# Patient Record
(demographics unavailable — no encounter records)

---

## 2017-08-08 NOTE — [facility] OPERATIVE REPORT E
[facility] Operative Report



NAME: MELISSA BATISTA

                                      MRN: [id number]

                             AGE: 73Y

DATE OF SURGERY: 08/08/2017           ROOM:



PREOPERATIVE DIAGNOSIS:

Cataract, right eye.



POSTOPERATIVE DIAGNOSIS:

Cataract, right eye.



PROCEDURE PERFORMED:

Phacoemulsification with posterior chamber intraocular lens, right eye.



SURGEON:

Lorraine Pugh MD



ANESTHESIA:

Topical with MAC.



INDICATIONS FOR SURGERY:

Difficulty with night driving and glare.  Best corrected visual acuity

20/50.



PROCEDURE:

The patient was brought to the operating room and placed on the operative

table.  Following tetracaine drops, topical anesthesia was administered.  

This consisted of instrument wipe pledgets soaked in a solution of 4%

Xylocaine mixed with 0.75% Marcaine in a 1:2 ratio.  A 2 x 1 cm pledget was

placed in the superior fornix.  A 1 x 1 cm pledget was placed in the

inferior fornix.  The eye was patched shut for 5 minutes.  The patch was

removed.  The eye was sterilely prepped and draped in the usual manner. 

Lid speculum was placed in the eye.  The pledgets were removed.  4-0 black

silk sutures were placed around the superior and the inferior rectus

muscles to be used as traction.  A conjunctival peritomy was made at the 10

o'clock position.  Hemostasis was obtained with bipolar cautery.  A

posterior limbal groove was created using a crescent knife and dissected

anteriorly towards the cornea.  A sharp point blade was used to create a

paracentesis site at the 2 o'clock position.  A 2.4 mm keratome was used to

enter the anterior chamber through the groove.  Viscoelastic was injected

into the anterior chamber.  An anterior capsulotomy was performed using

Utrata forceps in a capsulorrhexis fashion.  Hydrodissection and

hydrodelineation were performed.  Phacoemulsification was performed in

divide-and-conquer technique.  Total phaco time 40 seconds.



Following this, the I/A unit was used to remove residual cortex.

Viscoelastic was injected into the capsular bag.  Intraocular lens model

SN60WF, 22.0 diopters, serial number [id number] was placed in the

capsular bag. The I/A unit was used to remove residual viscoelastic.  The

wound was seen to be watertight under high and low pressure, and no sutures

were placed.  The intraocular lens was well centered.  The pressure was

adjusted in the eye to normal pressure.  The 4-0 black silk sutures and lid

speculum were removed.  The eye was shielded after Besivance drops were

placed.  The patient tolerated the procedure well and was sent to the

recovery room in good condition.





 





DICTATING PHYSICIAN: LORRAINE PUGH M.D.



1211M              DT: 08/08/2017 0843

PHY#: [id number]        DD: 08/08/2017 0832

ID:   [id number]               JOB#: [id number]       ACCT: [id number]



cc:LORRAINE PUGH M.D.

>

## 2017-08-08 NOTE — [facility] DISCHARGE SUMMARY E
[facility] Discharge Summary



NAME: MELISSA BATISTA

                                      MRN: [id number]

                                AGE: 73Y

ADMITTED: 08/08/2017           DISCHARGED: 08/08/2017



PREOPERATIVE DIAGNOSIS:

Cataract, right eye.



POSTOPERATIVE DIAGNOSIS:

Cataract, right eye.



HOSPITAL COURSE:

The patient is a 73-year-old lady who underwent uneventful cataract

extraction with intraocular lens implant, right eye, on 08/08/2017.  She

will be discharged to home.  She was instructed to resume preoperative

medications, take Tylenol as needed for discomfort, to keep her eye

shielded, to use Besivance, Durezol, and Ilevro at 3 p.m. and 8 p.m., and

to followup in my office in 1 day.









DICTATING PHYSICIAN: RAINE PUGH M.D.



1211M              DT: 08/08/2017 0846

PHY#: [id number]        DD: 08/08/2017 0832

ID:   [id number]               JOB#: [id number]       ACCT: [id number]



cc:RAINE PUGH M.D.

>

## 2017-09-19 NOTE — [facility]PER[facility] REPORT E
[facility] Oper[facility] Report



NAME: MELISSA BATISTA

                                      MRN: [id number]

                             AGE: 74Y

DATE OF SURGERY: 09/19/2017                   ROOM:



PREOPERATIVE DIAGNOSIS:

Cataract, left eye.



POSTOPERATIVE DIAGNOSIS:

Cataract, left eye.



OPERATION:

Phacoemulsification with posterior chamber intraocular lens, left eye.



SURGEON:

RAINE PUGH M.D.



ANESTHESIA:

Topical with MAC.



INDICATIONS FOR SURGERY:

Difficulty reading small print and difficulty with night driving.  Best

corrected visual acuity 20/50.



DESCRIPTION OF PROCEDURE:

The patient was brought to the Operating Room and placed on the operative

table. Following tetracaine drops, topical anesthesia was administered.

This consisted of instrument wipe pledgets soaked in a solution of 4%

Xylocaine mixed with 0.75% Marcaine in a 1:2 ratio. A 2 x 1 cm pledget was

placed in the superior fornix. A 1 x 1 cm pledget was placed in the

inferior fornix. The eye was patched shut for 5 minutes. The patch was

removed. The eye was sterilely prepped and draped in the usual manner. Lid

speculum was placed in the eye. The pledgets were removed. 4-0 black silk

sutures were placed around the superior and the inferior rectus muscles to

be used as traction. A conjunctival peritomy was made at the 10 o'clock

position. Hemostasis was obtained with bipolar cautery. A posterior limbal

groove was created using a crescent knife and dissected anteriorly towards

the cornea. A sharp point blade was used to create a paracentesis site at

the 2 o'clock position. A 2.4 mm keratome was used to enter the anterior

chamber through the groove. Viscoelastic was injected into the anterior

chamber. An anterior capsulotomy was performed using Utrata forceps in a

capsulorrhexis fashion. Hydrodissection and hydrodelineation were

performed. Phacoemulsification was performed in divide-and-conquer

technique. A total of 57 seconds phaco time was used.



Following this, the I/A unit was used to remove residual cortex.

Viscoelastic was injected into the capsular bag. Intraocular lens model

SN60WF, 21.5 diopters, serial number [id number] was placed in the

capsular bag. The I/A unit was used to remove residual viscoelastic. The

wound was seen to be watertight under high and low pressure, and no sutures

were placed. The intraocular lens was well centered. The pressure was

adjusted in the eye to normal pressure. The 4-0 black silk sutures and lid

speculum were removed. The eye was shielded after Besivance drops were

placed. The patient tolerated the procedure well and was sent to the

Recovery Room in good condition.















DICTATING PHYSICIAN: RAINE PUGH M.D.



1654M              DT: 09/19/2017 1123

PHY#: [id number]        DD: 09/19/2017 1100

ID:   [id number]               JOB#: [id number]       ACCT: [id number]



cc:RAINE PUGH M.D.

>

## 2017-09-19 NOTE — [facility] DISCHARGE SUMMARY E
[facility] Discharge Summary



NAME: MELISSA BATISTA

                                      MRN: [id number]

                                AGE: 74Y

ADMITTED: 09/19/2017           DISCHARGED: 09/19/2017



HOSPITAL COURSE:

The patient is a 74-year-old lady who underwent uneventful cataract

extraction with intraocular lens implant, left eye, on 09/19/2017.  She

will be discharged to home.  She is instructed to resume preoperative

medications, take Tylenol as needed for discomfort, keep her eye shielded,

to use Besivance, Durezol, and Ilevro at 3 p.m. and 8 p.m., and to follow

up in my office in 1 day.





DICTATING PHYSICIAN: RAINE PUGH M.D.



1654M              DT: 09/19/2017 1125

PHY#: [id number]        DD: 09/19/2017 1100

ID:   [id number]               JOB#: [id number]       ACCT: [id number]



cc:RAINE PUGH M.D.

>